# Patient Record
Sex: MALE | Race: WHITE | NOT HISPANIC OR LATINO | Employment: FULL TIME | URBAN - METROPOLITAN AREA
[De-identification: names, ages, dates, MRNs, and addresses within clinical notes are randomized per-mention and may not be internally consistent; named-entity substitution may affect disease eponyms.]

---

## 2019-01-04 ENCOUNTER — TELEPHONE (OUTPATIENT)
Dept: CARDIOLOGY | Facility: CLINIC | Age: 59
End: 2019-01-04

## 2019-01-04 DIAGNOSIS — Z91.89 AT RISK FOR CORONARY ARTERY DISEASE: Primary | ICD-10-CM

## 2019-01-04 NOTE — TELEPHONE ENCOUNTER
[facility] Call Center    Phone Message    May a detailed message be left on voicemail: yes    Reason for Call: Other: Patient states he has been in touch with Dr. Trevino and his care team. He called in to register and schedule appointments. Pt has been scheduled for an OV but states he also needs a CT. Please issue order if appropriate and call Dr. Dave (pt) back to schedule the CT appointment. Thank you.     Action Taken: Message routed to:  [facility] ([facility]): Cardiology

## 2019-01-07 NOTE — TELEPHONE ENCOUNTER
Order placed for CT calcium score; message sent to clinic coordinators to contact patient for scheduling.

## 2019-02-06 ENCOUNTER — DOCUMENTATION ONLY (OUTPATIENT)
Dept: CARE COORDINATION | Facility: CLINIC | Age: 59
End: 2019-02-06

## 2019-02-27 ENCOUNTER — OFFICE VISIT (OUTPATIENT)
Dept: CARDIOLOGY | Facility: CLINIC | Age: 59
End: 2019-02-27
Attending: INTERNAL MEDICINE
Payer: COMMERCIAL

## 2019-02-27 ENCOUNTER — HOSPITAL ENCOUNTER (OUTPATIENT)
Dept: CT IMAGING | Facility: CLINIC | Age: 59
Discharge: HOME | End: 2019-02-27
Attending: INTERNAL MEDICINE | Admitting: INTERNAL MEDICINE
Payer: COMMERCIAL

## 2019-02-27 VITALS
DIASTOLIC BLOOD PRESSURE: 84 MMHG | SYSTOLIC BLOOD PRESSURE: 123 MMHG | HEIGHT: 72 IN | WEIGHT: 210.6 LBS | HEART RATE: 78 BPM | OXYGEN SATURATION: 98 % | BODY MASS INDEX: 28.53 KG/M2

## 2019-02-27 DIAGNOSIS — R16.1 SPLENOMEGALY: ICD-10-CM

## 2019-02-27 DIAGNOSIS — R00.2 PALPITATIONS: ICD-10-CM

## 2019-02-27 DIAGNOSIS — I77.810 ASCENDING AORTA DILATATION (H): Primary | ICD-10-CM

## 2019-02-27 DIAGNOSIS — Z87.891 HISTORY OF PRIOR CIGARETTE SMOKING: ICD-10-CM

## 2019-02-27 DIAGNOSIS — Z91.89 AT RISK FOR CORONARY ARTERY DISEASE: ICD-10-CM

## 2019-02-27 DIAGNOSIS — R59.1 LYMPHADENOPATHY: ICD-10-CM

## 2019-02-27 LAB
CHOLEST SERPL-MCNC: 159 MG/DL
HDLC SERPL-MCNC: 43 MG/DL
LDLC SERPL CALC-MCNC: 94 MG/DL
NONHDLC SERPL-MCNC: 116 MG/DL
RADIOLOGIST FLAGS: NORMAL
TRIGL SERPL-MCNC: 110 MG/DL

## 2019-02-27 PROCEDURE — 36415 COLL VENOUS BLD VENIPUNCTURE: CPT | Performed by: INTERNAL MEDICINE

## 2019-02-27 PROCEDURE — G0463 HOSPITAL OUTPT CLINIC VISIT: HCPCS | Mod: ZF

## 2019-02-27 PROCEDURE — 99203 OFFICE O/P NEW LOW 30 MIN: CPT | Mod: ZP | Performed by: INTERNAL MEDICINE

## 2019-02-27 PROCEDURE — 80061 LIPID PANEL: CPT | Performed by: INTERNAL MEDICINE

## 2019-02-27 PROCEDURE — 75571 CT HRT W/O DYE W/CA TEST: CPT | Mod: 26 | Performed by: INTERNAL MEDICINE

## 2019-02-27 PROCEDURE — 75571 CT HRT W/O DYE W/CA TEST: CPT

## 2019-02-27 RX ORDER — ATOMOXETINE 10 MG/1
10 CAPSULE ORAL DAILY
COMMUNITY

## 2019-02-27 RX ORDER — ATENOLOL 25 MG/1
25 TABLET ORAL DAILY
COMMUNITY

## 2019-02-27 ASSESSMENT — MIFFLIN-ST. JEOR: SCORE: 1818.28

## 2019-02-27 ASSESSMENT — PAIN SCALES - GENERAL: PAINLEVEL: NO PAIN (0)

## 2019-02-27 NOTE — PATIENT INSTRUCTIONS
"You were seen today in the Cardiovascular Clinic at the [facility].     Cardiology Providers you saw during your visit: Dr. Trevino     Diagnosis:   Encounter Diagnoses   Name Primary?     Ascending aorta dilatation (H) Yes     Palpitations      History of prior cigarette smoking         Results for KIMMIE ESPINAL (MRN [id number]) as of 2019 10:32   Ref. Range 2019 08:21   Cholesterol Latest Ref Range: <200 mg/dL 159   HDL Cholesterol Latest Ref Range: >39 mg/dL 43   LDL Cholesterol Calculated Latest Ref Range: <100 mg/dL 94   Non HDL Cholesterol Latest Ref Range: <130 mg/dL 116   Triglycerides Latest Ref Range: <150 mg/dL 110       Results: Discussed with you today CT CAC      Orders:   Orders Placed This Encounter   Procedures     CTA Chest with Contrast     Follow-Up with Cardiologist       Current Medication List  Current Outpatient Medications   Medication Sig Dispense Refill     atenolol (TENORMIN) 25 MG tablet Take 25 mg by mouth daily Per pt takes 25mg PO BID       atomoxetine (STRATTERA) 10 MG capsule Take 10 mg by mouth daily Per pt takes 10mg PO QAM           Medications Discontinued:  There are no discontinued medications.      Recommendations:   1. No change in medications  2. CTA of chest with FLASH protocol  3. Follow BP    Follow-up: 6 months after CTA of the chest      [facility] with more accurate assessment of vascular stiffness.     [email or web address]       Please feel free to call me with any questions or concerns.       Brenton Daley LPN      Questions: [phone].   First press #1 for the [employer] and then press #3 for \"Medical Questions\" to reach us Cardiology Nurses.     [phone].   First press #1 for the [employer] and then press #1      On Call Cardiologist for after hours or on weekends: [phone]   option #4 and ask to speak to the on-call Cardiologist. "          If you need a medication refill please contact your pharmacy.  Please allow 3 business days for your refill to be completed.  ________________________________________________________________________________________________________________________________

## 2019-02-27 NOTE — LETTER
2/27/2019      RE: Amalia Dave  [address] [facility] [address]       Dear Colleague,    Thank you for the opportunity to participate in the care of your patient, Amalia Dave, at the [facility] HEART [facility]. Please see a copy of my visit note below.        CARDIOLOGY NEW OFFICE VISIT      HPI: Amalia Dave is a 58 year old male being seen today for evaluation of cardiovascular risk with prior history of chest pressure and palpitations. Also prior history of smoking, quite at age 32 but with 25 pack years.   Started taking low dose atenolol for palpitations 10 years ago and now at 25 mg twice daily. Increased the dose because of the breakthrough palpitations but more recently without symptoms. No prior history of hypertension. He has BP machine at home but not been checking his blood pressure.     Amalia has elliptical machine and rowing machine at home. Last time exercised about 2 months ago and then without symptoms. 12 year history of occasional palpitations/chest discomfort with emotional stress. Metoprolol trial was not successful and went back to atenolol twice daily. At that time chest discomfort improved. Always carries around with him the atenolol.     He is able to walk 6 flight of stairs (FOS) without stopping at normal or fast pace. He did that this morning walking from the [employer] parking.    Denies recent history of chest discomfort, dyspnea, PND (paroxysmal nocturnal dyspnea), orthopnea, pedal edema, lightheadedness and syncope.     The patient's risk factor profile is: (?) HTN, (-) DM, (-) hypercholesterolemia, (+)  prior 25 pack-year tobacco use, (-) fam Hx premature CAD. Mother with palpitations taking atenolol.   The patient has no history of cardiovascular disease (CAD, CHF, arrhythmia, valvular heart disease).  The patient has no Hx of PAD or cerebrovascular disease.  The patient has not undergone prior cardiovascular evaluation and  has never had an ECHO, stress study, cardiac catheterization, or EP study.     The 10-year ASCVD risk score (Mitch MCKEON Jr., et al., 2013) is: 5.6%    Values used to calculate the score:      Age: 58 years      Sex: Male      Is Non- : No      Diabetic: No      Tobacco smoker: No      Systolic Blood Pressure: 123 mmHg      Is BP treated: No      HDL Cholesterol: 43 mg/dL      Total Cholesterol: 159 mg/dL    PAST MEDICAL HISTORY:  No past medical history on file.    CURRENT MEDICATIONS:  Current Outpatient Medications   Medication Sig Dispense Refill     atenolol (TENORMIN) 25 MG tablet Take 25 mg by mouth daily Per pt takes 25mg PO BID       atomoxetine (STRATTERA) 10 MG capsule Take 10 mg by mouth daily Per pt takes 10mg PO QAM         PAST SURGICAL HISTORY:  No past surgical history on file.    ALLERGIES  Patient has no allergy information on record.    FAMILY HX:  No family history on file.    SOCIAL HX:  Social History     Socioeconomic History     Marital status:      Spouse name: None     Number of children: None     Years of education: None     Highest education level: None   Occupational History     None   Social Needs     Financial resource strain: None     Food insecurity:     Worry: None     Inability: None     Transportation needs:     Medical: None     Non-medical: None   Tobacco Use     Smoking status: Former Smoker     Smokeless tobacco: Never Used   Substance and Sexual Activity     Alcohol use: None     Drug use: None     Sexual activity: None   Lifestyle     Physical activity:     Days per week: None     Minutes per session: None     Stress: None   Relationships     Social connections:     Talks on phone: None     Gets together: None     Attends Worship service: None     Active member of club or organization: None     Attends meetings of clubs or organizations: None     Relationship status: None     Intimate partner violence:     Fear of current or ex partner: None      Emotionally abused: None     Physically abused: None     Forced sexual activity: None   Other Topics Concern     None   Social History Narrative     None     Two children and wife at home. [place] from [place] and came here [place]. 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    ROS:  Constitutional: No recent fever, chills, or sweats. No significant weight gain/loss.   ENT: No epistaxis.  Allergies/Immunologic: As above.   Respiratory: No hemoptysis.   Cardiovascular: As per HPI.   GI: No hematemesis, melena, or hematochezia.   : No hematuria.   Skin: No petechia or ecchymosis.   Endocrinology: Negative.   Musculoskeletal: No myalgia.    VITAL SIGNS:  /84 (BP Location: Right arm, Patient Position: Chair, Cuff Size: Adult Regular)   Pulse 78   Ht 1.829 m (6')   Wt 95.5 kg (210 lb 9.6 oz)   SpO2 98%   BMI 28.56 kg/m       Body mass index is 28.56 kg/m .  Wt Readings from Last 2 Encounters:   02/27/19 95.5 kg (210 lb 9.6 oz)       PHYSICAL EXAM  Amalia Dave is a 58 year old male in no acute distress.  HEENT: Unremarkable.  Neck: JVP normal.  Carotids bilaterally without bruits.  Lungs: CTA.  Cor: RRR. Normal S1 and S2.  No murmur, rub, or gallop.   Abd: Soft, nontender, nondistended.  NABS.  No pulsatile mass.  Extremities: No C/C/E.  Pulses symmetric in upper and lower extremities.  Neuro: Grossly intact.    LABS    Recent Labs   Lab Test 02/27/19  0821   CHOL 159   HDL 43   LDL 94   TRIG 110   NHDL 116      EKG:  During CT with sinus rhythm     ECHO: Never    STRESS TEST:  Never    CARDIAC CATH:  Never    CT:  Today  IMPRESSIONS:  1.  No coronary calcifications.  2.  The total Agatston calcium score is 0 placing the patient in the  lowest percentile when compared to age and gender matched control  group.  3.  Ascending aorta measures 3.9 cm, which is normal for given age and  Gender.      Study Result     EXAMINATION: RADIOLOGIST CONSULT FOR CARDIOLOGY, 2/27/2019 8:42 AM     TECHNIQUE:  Radiology  consultation for cardiac CT.     COMPARISON: None available.     HISTORY: Tobacco use; CAD eval, low CHD risk, Asx for ischemia; At  risk for coronary artery disease     FINDINGS:     The visualized portions of the central tracheobronchial tree are  patent. No focal airspace opacities. No visualized pneumothorax or  pleural effusion. No suspicious nodules or masses. Calcified  granulomas. Multiple enlarged mediastinal lymph nodes, including 18 mm  subcarinal node (series 4, image 13) and partially visualized 13 mm  prevascular node (series 4, image 1).      The heart size is normal. Mild coronary artery calcifications. No  pericardial effusion. Visualized portions of the thoracic great  vessels are unremarkable.      Limited views of the abdomen is limited. Splenomegaly partly imaged.  No worrisome bony or soft tissue lesions.                                                                      IMPRESSION:   1. Nonspecific mediastinal lymphadenopathy and splenomegaly. Recommend  comparison with prior imaging if available and/or further evaluation  with dedicated chest CT.         ASSESSMENT AND PLAN:    1. Ascending aorta dilatation (H)    2. Palpitations    3. History of prior cigarette smoking    4. Splenomegaly    5. Lymphadenopathy      58 year old male being seen today for evaluation of cardiovascular risk with prior history of chest pressure and palpitations. Also prior history of smoking, quite at age 32 but with 25 pack years.     Started taking low dose atenolol for palpitations 10 years ago and now at 25 mg twice daily. Increased the dose because of the breakthrough palpitations but more recently without symptoms. No prior history of hypertension. He has BP machine at home but not been checking his blood pressure.     Amalia has elliptical machine and rowing machine at home. Last time exercised about 2 months ago and then without symptoms. 12 year history of occasional palpitations/chest discomfort with  emotional stress. Metoprolol trial was not successful and went back to atenolol twice daily. At that time chest discomfort improved. Always carries around with him the atenolol.     He is able to walk 6 flight of stairs (FOS) without stopping at normal or fast pace. He did that this morning walking from the [employer] parking.    Based on non-contrast CT and fasting lipid panel with risk calculator Amalia does not qualify for statin therapy or aspirin. His chest discomfort unlikely to be coronary disease but dilated aorta makes for concern for occult hypertension and might benefit from assessment at [facility] for vascular stiffness and consideration of ACE/ARB in addition to atenolol    Dilated ascending aorta measuring 4.0 cm based on my measurements and plan for CTA in 6-12 months, based on future non-cardiovascular CT scanning.     Incidental adenopathy of the mediastinum with splenomegaly and will refer workup to PCP. 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 Stated that he has prior history of adenopathy     Recommendations:   1. No change in medications  2. CTA of chest with FLASH protocol after 6-12 months  3. Follow BP  4. Consider [facility] for vascular stiffness  5. PCP for adenopathy/splenomegaly    Follow-up: 6-12 months after CTA of the chest    Aris Trevino MD    Division of Cardiology  [facility] & Surgery [address] [address] [phone] Appointments  [phone] Fax  [phone] After hours    Clinic nurse:  Brenton Daley LPN   Nurse Care Coordinator- Heart Care   [phone] option 1, than option 3    Academic Mailing address:  [facility]  Department of Internal Medicine [facility] [phone] [place] [address]

## 2019-02-27 NOTE — NURSING NOTE
Chief Complaint   Patient presents with     New Patient     new cardiac screening for CAD     Vitals were taken and medications were reconciled.     Nina Dyson CMA    9:48 AM

## 2019-03-06 NOTE — RESULT ENCOUNTER NOTE
These results are abnormal. Lets plan for him to follow up with PCP.  Patient was contacted directely. Betzaida schilling was also sent to patient.   Aris Trevino

## 2020-01-02 ENCOUNTER — TELEPHONE (OUTPATIENT)
Dept: CARDIOLOGY | Facility: CLINIC | Age: 60
End: 2020-01-02

## 2020-01-02 NOTE — TELEPHONE ENCOUNTER
I called pt to schedule this appointment and as I was talking with the patient and he said that Dr Ryan wanted him to have a Ct with FLASH prior to the next office visit. I am not able to schedule that test. Can you call the pt and schedule that for him and then he needs a follow-up with cardiologist after.   I told him I would send this to the nurse for this to get taken care of.     Thank you   Zacarias CAMPOS

## 2020-01-02 NOTE — TELEPHONE ENCOUNTER
Spoke with patient.     Needs  to schedule CT and follow up with cardiologist. Can be with any general cardiologist. Transferred to scheduling line.

## 2020-01-02 NOTE — TELEPHONE ENCOUNTER
[facility] Call Center    Phone Message    May a detailed message be left on voicemail: yes    Reason for Call: Other: Amalia called in to schedule an appointment with Dr. Trevino, but he was not aware he had left the clinic. Amalia would like to discuss with someone about which provider he should switch to. Please give Amalia a call back at your earliest convenience.     Action Taken: Message routed to:  [facility] ([facility]): [facility]io

## 2020-01-03 NOTE — TELEPHONE ENCOUNTER
RECORDS RECEIVED FROM:   DATE RECEIVED:   NOTES STATUS DETAILS   OFFICE NOTE from referring provider    Internal 1 yr f/u from OV with Belinda   OFFICE NOTE from other cardiologist    Internal 2-27-19 Belinda   DISCHARGE SUMMARY from hospital    N/A    DISCHARGE REPORT from the ER   N/A    OPERATIVE REPORT    N/A    MEDICATION LIST   Internal    LABS     BMP   N/A    CBC   N/A    CMP   N/A    Lipids   Internal 2-27-19   TSH   N/A    DIAGNOSTIC PROCEDURES     EKG   N/A    Monitor Reports   N/A    IMAGING (DISC & REPORT)      Echo   N/A    Stress Tests   N/A    Cath   N/A    MRI/MRA   N/A    CT/CTA   Internal 2-27-19, scheduled prior to appt

## 2020-02-18 ENCOUNTER — PRE VISIT (OUTPATIENT)
Dept: CARDIOLOGY | Facility: CLINIC | Age: 60
End: 2020-02-18

## 2020-02-18 ENCOUNTER — OFFICE VISIT (OUTPATIENT)
Dept: CARDIOLOGY | Facility: CLINIC | Age: 60
End: 2020-02-18
Attending: INTERNAL MEDICINE
Payer: COMMERCIAL

## 2020-02-18 ENCOUNTER — ANCILLARY PROCEDURE (OUTPATIENT)
Dept: CT IMAGING | Facility: CLINIC | Age: 60
End: 2020-02-18
Attending: INTERNAL MEDICINE
Payer: COMMERCIAL

## 2020-02-18 ENCOUNTER — TELEPHONE (OUTPATIENT)
Dept: CARDIOLOGY | Facility: CLINIC | Age: 60
End: 2020-02-18

## 2020-02-18 VITALS
OXYGEN SATURATION: 97 % | HEIGHT: 72 IN | SYSTOLIC BLOOD PRESSURE: 138 MMHG | WEIGHT: 213 LBS | HEART RATE: 71 BPM | BODY MASS INDEX: 28.85 KG/M2 | DIASTOLIC BLOOD PRESSURE: 80 MMHG

## 2020-02-18 DIAGNOSIS — I77.810 ASCENDING AORTA DILATATION (H): ICD-10-CM

## 2020-02-18 DIAGNOSIS — Z87.891 HISTORY OF PRIOR CIGARETTE SMOKING: ICD-10-CM

## 2020-02-18 DIAGNOSIS — R00.2 PALPITATIONS: ICD-10-CM

## 2020-02-18 LAB
CREAT BLD-MCNC: 0.8 MG/DL (ref 0.66–1.25)
GFR SERPL CREATININE-BSD FRML MDRD: >90 ML/MIN/{1.73_M2}
RADIOLOGIST FLAGS: ABNORMAL

## 2020-02-18 PROCEDURE — G0463 HOSPITAL OUTPT CLINIC VISIT: HCPCS

## 2020-02-18 PROCEDURE — 99214 OFFICE O/P EST MOD 30 MIN: CPT | Mod: ZP | Performed by: INTERNAL MEDICINE

## 2020-02-18 PROCEDURE — 93005 ELECTROCARDIOGRAM TRACING: CPT | Mod: ZF

## 2020-02-18 PROCEDURE — 93010 ELECTROCARDIOGRAM REPORT: CPT | Mod: ZP | Performed by: INTERNAL MEDICINE

## 2020-02-18 RX ORDER — IOPAMIDOL 755 MG/ML
100 INJECTION, SOLUTION INTRAVASCULAR ONCE
Status: COMPLETED | OUTPATIENT
Start: 2020-02-18 | End: 2020-02-18

## 2020-02-18 RX ADMIN — IOPAMIDOL 100 ML: 755 INJECTION, SOLUTION INTRAVASCULAR at 08:34

## 2020-02-18 ASSESSMENT — PAIN SCALES - GENERAL: PAINLEVEL: NO PAIN (0)

## 2020-02-18 ASSESSMENT — MIFFLIN-ST. JEOR: SCORE: 1824.16

## 2020-02-18 NOTE — TELEPHONE ENCOUNTER
Call from [facility] with [facility] with urgent finding :     1. Mediastinal lymphadenopathy. Further workup recommended.  Lymphadenopathy was incompletely included in the previous study and  direct comparison cannot be made.     Message sent to Dr. Ruiz for advisement.

## 2020-02-18 NOTE — LETTER
2/18/2020      RE: Amalia Dave  [address]  [facility] [address]       Dear Colleague,    Thank you for the opportunity to participate in the care of your patient, Amalia Dave, at the [facility]. Please see a copy of my visit note below.       SUBJECTIVE:  Amalia Dave is a 59 year old male who presents for follow-up.  Patient was evaluated in 2019 February for cardiac risk assessment/detection of premature coronary artery disease.  He had a coronary CTA which showed calcium score of 0 and aortic size of 3.9 cm.  Patient was diagnosed with a thoracic aortic aneurysm and he is here for follow-up.  Patient had a CT scan and result is not available yet.    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    There are no active problems to display for this patient.   .  Current Outpatient Medications   Medication Sig     atenolol (TENORMIN) 25 MG tablet Take 25 mg by mouth daily Per pt takes 25mg PO BID     atomoxetine (STRATTERA) 10 MG capsule Take 10 mg by mouth daily Per pt takes 10mg PO QAM     No current facility-administered medications for this visit.      No past medical history on file.  No past surgical history on file.  No Known Allergies  Social History     Socioeconomic History     Marital status:      Spouse name: Not on file     Number of children: Not on file     Years of education: Not on file     Highest education level: Not on file   Occupational History     Not on file   Social Needs     Financial resource strain: Not on file     Food insecurity:     Worry: Not on file     Inability: Not on file     Transportation needs:     Medical: Not on file     Non-medical: Not on file   Tobacco Use     Smoking status: Former Smoker     Smokeless tobacco: Never Used   Substance and Sexual Activity     Alcohol use: Not on file     Drug use: Not on file     Sexual activity: Not on file   Lifestyle     Physical activity:     Days per week: Not  on file     Minutes per session: Not on file     Stress: Not on file   Relationships     Social connections:     Talks on phone: Not on file     Gets together: Not on file     Attends Baptism service: Not on file     Active member of club or organization: Not on file     Attends meetings of clubs or organizations: Not on file     Relationship status: Not on file     Intimate partner violence:     Fear of current or ex partner: Not on file     Emotionally abused: Not on file     Physically abused: Not on file     Forced sexual activity: Not on file   Other Topics Concern     Not on file   Social History Narrative     Not on file     No family history on file.       REVIEW OF SYSTEMS:  General: negative, fever, chills, night sweats  Skin: negative, acne, rash and scaling  Eyes: negative, double vision, eye pain and photophobia  Ears/Nose/Throat: negative, nasal congestion and purulent rhinorrhea  Respiratory: No dyspnea on exertion, No cough, No hemoptysis and negative  Cardiovascular: negative, palpitations, tachycardia, irregular heart beat, chest pain, exertional chest pain or pressure, paroxysmal nocturnal dyspnea, dyspnea on exertion and orthopnea         OBJECTIVE:  Blood pressure 138/80, pulse 71, height 1.829 m (6'), weight 96.6 kg (213 lb), SpO2 97 %.  General Appearance: healthy, alert, active and no distress  Head: Normocephalic. No masses, lesions, tenderness or abnormalities  Eyes: conjuctiva clear, PERRL, EOM intact  Ears: External ears normal. Canals clear. TM's normal.  Nose: Nares normal  Mouth: normal  Neck: Supple, no cervical adenopathy, no thyromegaly  Lungs: clear to auscultation  Cardiac: regular rate and rhythm, normal S1 and S2, no murmur         ASSESSMENT/PLAN:  Patient here for yearly follow-up for thoracic aortic aneurysm.  This was diagnosed last year.  It was incidentally found on a coronary calcium score CT.  His calcium score was 0.  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  He is on atenolol 25 mg daily for past 10 years to prevent palpitation.  Patient is active and asymptomatic.  Reviewed EKG done today.  This shows normal sinus rhythm with LVH by voltage criteria.  Had a CT angiogram of the chest today the results are not yet available.  If the aneurysm is confirmed with the size indexed to his body surface area then either will start losartan or increase the dose of beta-blocker.  Also will do an echocardiogram today to assess the aortic size.  If the CT and echo are similar patient will be followed up by echocardiogram instead of CT.  Patient will be contacted with the results.  Per orders.   Return to Clinic PRN.    Please do not hesitate to contact me if you have any questions/concerns.     Sincerely,     CATHY Kearney MD

## 2020-02-18 NOTE — PATIENT INSTRUCTIONS
Patient Instructions:  Complete echocardiogram. As soon as results are compiled and reviewed, you will be notified.       It was a pleasure to see you in the cardiology clinic today.    We are encouraging the use of [employer] to communicate with your Healthcare Provider.  If you have any questions, call  Brenton Daley LPN, at [phone].  Press Option #1 for the [facility], and then press Option #4 for nursing.  Cardiology Fax  : [phone]      If you have an urgent need after hours (8:00 am to 4:30 pm) please call [phone] and ask for the cardiology fellow on call.

## 2020-02-18 NOTE — PROGRESS NOTES
SUBJECTIVE:  Amalia Dave is a 59 year old male who presents for follow-up.  Patient was evaluated in 2019 February for cardiac risk assessment/detection of premature coronary artery disease.  He had a coronary CTA which showed calcium score of 0 and aortic size of 3.9 cm.  Patient was diagnosed with a thoracic aortic aneurysm and he is here for follow-up.  Patient had a CT scan and result is not available yet.    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    There are no active problems to display for this patient.   .  Current Outpatient Medications   Medication Sig     atenolol (TENORMIN) 25 MG tablet Take 25 mg by mouth daily Per pt takes 25mg PO BID     atomoxetine (STRATTERA) 10 MG capsule Take 10 mg by mouth daily Per pt takes 10mg PO QAM     No current facility-administered medications for this visit.      No past medical history on file.  No past surgical history on file.  No Known Allergies  Social History     Socioeconomic History     Marital status:      Spouse name: Not on file     Number of children: Not on file     Years of education: Not on file     Highest education level: Not on file   Occupational History     Not on file   Social Needs     Financial resource strain: Not on file     Food insecurity:     Worry: Not on file     Inability: Not on file     Transportation needs:     Medical: Not on file     Non-medical: Not on file   Tobacco Use     Smoking status: Former Smoker     Smokeless tobacco: Never Used   Substance and Sexual Activity     Alcohol use: Not on file     Drug use: Not on file     Sexual activity: Not on file   Lifestyle     Physical activity:     Days per week: Not on file     Minutes per session: Not on file     Stress: Not on file   Relationships     Social connections:     Talks on phone: Not on file     Gets together: Not on file     Attends Yarsanism service: Not on file     Active member of club or organization: Not on file     Attends meetings of clubs or  organizations: Not on file     Relationship status: Not on file     Intimate partner violence:     Fear of current or ex partner: Not on file     Emotionally abused: Not on file     Physically abused: Not on file     Forced sexual activity: Not on file   Other Topics Concern     Not on file   Social History Narrative     Not on file     No family history on file.       REVIEW OF SYSTEMS:  General: negative, fever, chills, night sweats  Skin: negative, acne, rash and scaling  Eyes: negative, double vision, eye pain and photophobia  Ears/Nose/Throat: negative, nasal congestion and purulent rhinorrhea  Respiratory: No dyspnea on exertion, No cough, No hemoptysis and negative  Cardiovascular: negative, palpitations, tachycardia, irregular heart beat, chest pain, exertional chest pain or pressure, paroxysmal nocturnal dyspnea, dyspnea on exertion and orthopnea         OBJECTIVE:  Blood pressure 138/80, pulse 71, height 1.829 m (6'), weight 96.6 kg (213 lb), SpO2 97 %.  General Appearance: healthy, alert, active and no distress  Head: Normocephalic. No masses, lesions, tenderness or abnormalities  Eyes: conjuctiva clear, PERRL, EOM intact  Ears: External ears normal. Canals clear. TM's normal.  Nose: Nares normal  Mouth: normal  Neck: Supple, no cervical adenopathy, no thyromegaly  Lungs: clear to auscultation  Cardiac: regular rate and rhythm, normal S1 and S2, no murmur         ASSESSMENT/PLAN:  Patient here for yearly follow-up for thoracic aortic aneurysm.  This was diagnosed last year.  It was incidentally found on a coronary calcium score CT.  His calcium score was 0.  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  He is on atenolol 25 mg daily for past 10 years to prevent palpitation.  Patient is active and asymptomatic.  Reviewed EKG done today.  This shows normal sinus rhythm with LVH by voltage criteria.  Had a CT angiogram of the chest today the results are not yet  available.  If the aneurysm is confirmed with the size indexed to his body surface area then either will start losartan or increase the dose of beta-blocker.  Also will do an echocardiogram today to assess the aortic size.  If the CT and echo are similar patient will be followed up by echocardiogram instead of CT.  Patient will be contacted with the results.  Per orders.   Return to Clinic PRN.

## 2020-02-18 NOTE — NURSING NOTE
Chief Complaint   Patient presents with     New Patient     1 year return     Vitals were taken and medications were reconciled. EKG was performed.    Nina Dyson CMA    9:10 AM

## 2020-02-19 LAB — INTERPRETATION ECG - MUSE: NORMAL

## 2020-02-20 DIAGNOSIS — I77.810 ASCENDING AORTA DILATATION (H): Primary | ICD-10-CM

## 2020-02-20 DIAGNOSIS — R00.2 PALPITATIONS: ICD-10-CM

## 2020-02-20 RX ORDER — LOSARTAN POTASSIUM 25 MG/1
25 TABLET ORAL DAILY
Qty: 90 TABLET | Refills: 3 | Status: SHIPPED | OUTPATIENT
Start: 2020-02-20 | End: 2021-02-17

## 2020-03-11 ENCOUNTER — HEALTH MAINTENANCE LETTER (OUTPATIENT)
Age: 60
End: 2020-03-11

## 2021-01-03 ENCOUNTER — HEALTH MAINTENANCE LETTER (OUTPATIENT)
Age: 61
End: 2021-01-03

## 2021-02-14 DIAGNOSIS — I77.810 ASCENDING AORTA DILATATION (H): ICD-10-CM

## 2021-02-14 DIAGNOSIS — R00.2 PALPITATIONS: ICD-10-CM

## 2021-02-17 DIAGNOSIS — I77.810 ASCENDING AORTA DILATATION (H): Primary | ICD-10-CM

## 2021-02-17 DIAGNOSIS — R00.2 PALPITATIONS: ICD-10-CM

## 2021-02-17 RX ORDER — LOSARTAN POTASSIUM 25 MG/1
25 TABLET ORAL DAILY
Qty: 60 TABLET | Refills: 0 | Status: SHIPPED | OUTPATIENT
Start: 2021-02-17 | End: 2021-04-17

## 2021-02-17 NOTE — TELEPHONE ENCOUNTER
LOSARTAN 25MG TABLETS    Last Written Prescription Date:  2/20/20  Last Fill Quantity: 90,   # refills: 3  Last Office Visit : 2/18/20   Future Office visit:  None  3/5/2017 Potassium 4.2       Routing refill request to provider for review/approval because:  No K since 2017, return prn per note 2/18/20    Ascending aorta dilatation (H) [I77.810]  - Primary       Palpitations [R00.2]

## 2021-04-15 DIAGNOSIS — R00.2 PALPITATIONS: ICD-10-CM

## 2021-04-15 DIAGNOSIS — I77.810 ASCENDING AORTA DILATATION (H): ICD-10-CM

## 2021-04-17 NOTE — TELEPHONE ENCOUNTER
LOSARTAN 25MG TABLETS      Last Written Prescription Date:  2/17/21  Last Fill Quantity: 60,   # refills: 0  Last Office Visit : CATHY Kearney MD  Cardiovascular Disease  2/18/2020  [facility]  Recommended follow up prn  Future Office visit:  None scheduled    Routing refill request to provider for review/approval because:  Blood pressure out dated  BP Readings from Last 3 Encounters:   02/18/20 138/80   02/27/19 123/84     Overdue for creatinine  Lab Test 02/18/20  0828   CREAT 0.8     Nothing more recent in care everywhere nor media  Received limited supply without refills at last fill with information to call to schedule  Cardiology continue to refill or defer to primary?

## 2021-04-19 RX ORDER — LOSARTAN POTASSIUM 25 MG/1
25 TABLET ORAL DAILY
Qty: 60 TABLET | Refills: 0 | Status: SHIPPED | OUTPATIENT
Start: 2021-04-19

## 2021-04-25 ENCOUNTER — HEALTH MAINTENANCE LETTER (OUTPATIENT)
Age: 61
End: 2021-04-25

## 2021-10-10 ENCOUNTER — HEALTH MAINTENANCE LETTER (OUTPATIENT)
Age: 61
End: 2021-10-10

## 2022-05-21 ENCOUNTER — HEALTH MAINTENANCE LETTER (OUTPATIENT)
Age: 62
End: 2022-05-21

## 2022-09-18 ENCOUNTER — HEALTH MAINTENANCE LETTER (OUTPATIENT)
Age: 62
End: 2022-09-18

## 2022-12-07 ENCOUNTER — TELEPHONE (OUTPATIENT)
Dept: CARDIOLOGY | Facility: CLINIC | Age: 62
End: 2022-12-07

## 2022-12-07 NOTE — TELEPHONE ENCOUNTER
[facility] Call Center    Phone Message    May a detailed message be left on voicemail: yes     Reason for Call: Other: Per pt mychart request:   Follow-up of borderline dilatation of ascending aorta (Dr. Kearney's plan was to monitor it with periodic echocardiograms; last was done in Feb/2020).       Pt will need echo orders.  Please call pt to coordinate once orders are placed.     Action Taken: Other: cardio    Travel Screening: Not Applicable

## 2022-12-08 DIAGNOSIS — I77.810 ASCENDING AORTA DILATATION (H): Primary | ICD-10-CM

## 2023-02-14 ENCOUNTER — ANCILLARY PROCEDURE (OUTPATIENT)
Dept: CARDIOLOGY | Facility: CLINIC | Age: 63
End: 2023-02-14
Attending: INTERNAL MEDICINE
Payer: COMMERCIAL

## 2023-02-14 DIAGNOSIS — I77.810 ASCENDING AORTA DILATATION (H): ICD-10-CM

## 2023-02-14 LAB — LVEF ECHO: NORMAL

## 2023-02-14 PROCEDURE — 93306 TTE W/DOPPLER COMPLETE: CPT | Performed by: INTERNAL MEDICINE

## 2023-06-04 ENCOUNTER — HEALTH MAINTENANCE LETTER (OUTPATIENT)
Age: 63
End: 2023-06-04

## 2024-07-14 ENCOUNTER — HEALTH MAINTENANCE LETTER (OUTPATIENT)
Age: 64
End: 2024-07-14

## 2025-07-19 ENCOUNTER — HEALTH MAINTENANCE LETTER (OUTPATIENT)
Age: 65
End: 2025-07-19